# Patient Record
Sex: FEMALE | Race: WHITE | Employment: STUDENT | URBAN - METROPOLITAN AREA
[De-identification: names, ages, dates, MRNs, and addresses within clinical notes are randomized per-mention and may not be internally consistent; named-entity substitution may affect disease eponyms.]

---
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
## 2019-01-03 ENCOUNTER — HOSPITAL ENCOUNTER (OUTPATIENT)
Dept: PHYSICAL THERAPY | Age: 19
Discharge: HOME | End: 2019-01-03
Payer: COMMERCIAL

## 2019-01-03 PROCEDURE — 97110 THERAPEUTIC EXERCISES: CPT

## 2019-01-03 PROCEDURE — 97140 MANUAL THERAPY 1/> REGIONS: CPT

## 2019-01-03 PROCEDURE — 97016 VASOPNEUMATIC DEVICE THERAPY: CPT

## 2019-01-03 NOTE — PROGRESS NOTES
PT DAILY TREATMENT NOTE - [facility] 2-15 Patient Name: Lorna Conway Date:1/3/2019 : 2000 [x]  Patient  Verified Payor: BLUE CROSS / Plan: [facility] PPO / Product Type: PPO / In time:10:30 am  Out time:11:40 am 
Total Treatment Time (min): 70 minutes Total Timed Codes (min): 70 minutes 1:1 Treatment Time (MC only): 39 minutes Visit #: 2 Treatment Area: Bilateral knee pain [M25.561, M25.562] SUBJECTIVE Pain Level (0-10 scale): 0/10 Any medication changes, allergies to medications, adverse drug reactions, diagnosis change, or new procedure performed?: [x] No    [] Yes (see summary sheet for update) Subjective functional status/changes:   [] No changes reported Pt reports same symptoms since evaluation. OBJECTIVE Modality rationale: decrease inflammation and decrease pain to improve the patients ability to ambulate with decreased pain. Type Additional Details  
 
 [] Estim: []Att   []Unatt    []TENS instruct []IFC  []Premod   []NMES []Other:  []w/US   []w/ice   []w/heat Position: Location:  
 
 []  Traction: [] Cervical       []Lumbar 
                     [] Prone          []Supine []Intermittent   []Continuous Lbs: 
[] before manual 
[] after manual 
[]w/heat  
 []  Ultrasound: []Continuous   [] Pulsed  
                    at: []1MHz   []3MHz Location: 
W/cm2:  
 [] Paraffin Location:  
[]w/heat  
 []  Ice     []  Heat 
[]  Ice massage Position: Location:  
 []  Laser 
[]  Other: Position: Location:  
 
15 []  Vasopneumatic Device Pressure:       [] lo [x] med [] hi  
Temperature: 34  
 
[x] Skin assessment post-treatment:  [x]intact []redness- no adverse reaction 
  []redness  adverse reaction:  
 
45 min Therapeutic Exercise:  [x] See flow sheet :  
Rationale: increase ROM and increase strength to improve the patients ability to run without pain. 10 min Manual Therapy: Medial patellar glides; Whipple taping of bilateral patella Rationale: decrease pain and increase tissue extensibility to improve the patients ability to run without pain. With 
 [x] TE 
 [] TA 
 [] neuro 
 [] other: Patient Education: [x] Review HEP [] Progressed/Changed HEP based on:  
[] positioning   [] body mechanics   [] transfers   [] heat/ice application   
[] other:   
 
Other Objective/Functional Measures:  
 
Pain Level (0-10 scale) post treatment: 0/10 ASSESSMENT/Changes in Function:  
Performed Whipple taping technique for proprioceptive input as well as pain reduction with activity; Pt noted feeling \"weird,\" but did not have any pain with ambulation afterwards; will monitor response over the rest of the day. Initiated SL squats in order to begin loading the knee in tolerable fashion; Pt noted mild discomfort around 3-4/10 p! Initially that did not increase with repetition. Also added squats on total gym that initially caused bilateral knee discomfort, however, gravity was reduced and Pt noted pain got better with repetition. Added side steps and monster walks for CKC hip abduction strengthening without any p! Reported by Pt. Continue progressing therex as tolerated. Patient will continue to benefit from skilled PT services to modify and progress therapeutic interventions, address functional mobility deficits, address ROM deficits, address strength deficits, analyze and address soft tissue restrictions, analyze and cue movement patterns and analyze and modify body mechanics/ergonomics to attain remaining goals. [x]  See Plan of Care 
[]  See progress note/recertification 
[]  See Discharge Summary Progress towards goals / Updated goals: 
Short Term Goals: To be accomplished in 6 treatments: 1. Pt will be independent and compliant with HEP.  
             2. Pt will improve FOTO score by the MCID from 66/100 to 80/100 demonstrating improved overall function with decreased pain or discomfort. 3. Pt will be able to pass the Running Readiness Assessment demonstrating sufficient strength to return to run without pain. 4. Pt will be able to run >/= 6K without complaints of knee pain >2/10 bilaterally. 5. Pt will be able to navigate a full flight of stairs without knee pain >2/10 bilaterally. PLAN [x]  Upgrade activities as tolerated     [x]  Continue plan of care [x]  Update interventions per flow sheet      
[]  Discharge due to:_ 
[]  Other:_ Lesa Amezcua 1/3/2019  10:03 AM

## 2019-01-07 ENCOUNTER — HOSPITAL ENCOUNTER (OUTPATIENT)
Dept: PHYSICAL THERAPY | Age: 19
Discharge: HOME | End: 2019-01-07
Payer: COMMERCIAL

## 2019-01-07 PROCEDURE — 97140 MANUAL THERAPY 1/> REGIONS: CPT

## 2019-01-07 PROCEDURE — 97110 THERAPEUTIC EXERCISES: CPT

## 2019-01-07 NOTE — PROGRESS NOTES
PT DAILY TREATMENT NOTE - [facility] 2-15 Patient Name: Chery Artis Date:2019 : 2000 [x]  Patient  Verified Payor: BLUE CROSS / Plan: [facility] PPO / Product Type: PPO / In time: 3:03 pm  Out time:4:02 pm 
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 \"  She only had mild discomfort at end of the day on Saturday and after performing SL squats at home, though ice relieved this discomfort. OBJECTIVE 51 min Therapeutic Exercise:  [x] See flow sheet :  
Rationale: increase ROM and increase strength to improve the patients ability to run without pain. 8 min Manual Therapy: Medial patellar glides; Whipple taping of bilateral patella Rationale: decrease pain and increase tissue extensibility to improve the patients ability to run without pain. With 
 [x] TE 
 [] TA 
 [] neuro 
 [] other: Patient Education: [x] Review HEP [] Progressed/Changed HEP based on:  
[] positioning   [] body mechanics   [] transfers   [] heat/ice application   
[] other:   
 
Other Objective/Functional Measures:  
 
Pain Level (0-10 scale) post treatment: 0/10 ASSESSMENT/Changes in Function:  
Utilizing SL Squats as an asterisk sign, Pt noted 4-5/10 pain in R knee with R SL squats without tape which was completely relieved post-Whipple taping technique.   Pt did not note any pain with L SL squats pre or post-taping. Introduced wall sits and Pt was able to perform for 40\" before fatigue. Also added steamboats in order to promote stabilization in stance leg and hip strengthening in active leg; Pt noted fatigue with exercise, though denied any pain. Pt leaves for college this weekend, so plan is to D/C next session. Patient will continue to benefit from skilled PT services to modify and progress therapeutic interventions, address functional mobility deficits, address ROM deficits, address strength deficits, analyze and address soft tissue restrictions, analyze and cue movement patterns and analyze and modify body mechanics/ergonomics to attain remaining goals. [x]  See Plan of Care 
[]  See progress note/recertification 
[]  See Discharge Summary Progress towards goals / Updated goals: 
Short Term Goals: To be accomplished in 6 treatments: 1. Pt will be independent and compliant with HEP. 2. Pt will improve FOTO score by the MCID from 66/100 to 80/100 demonstrating improved overall function with decreased pain or discomfort. 3. Pt will be able to pass the Running Readiness Assessment demonstrating sufficient strength to return to run without pain. 4. Pt will be able to run >/= 6K without complaints of knee pain >2/10 bilaterally. 5. Pt will be able to navigate a full flight of stairs without knee pain >2/10 bilaterally. PLAN [x]  Upgrade activities as tolerated     [x]  Continue plan of care [x]  Update interventions per flow sheet      
[]  Discharge due to:_ 
[]  Other:_ Ronan Guerrier 1/7/2019  10:03 AM

## 2019-01-10 ENCOUNTER — APPOINTMENT (OUTPATIENT)
Dept: PHYSICAL THERAPY | Age: 19
End: 2019-01-10
Payer: COMMERCIAL

## 2019-03-12 NOTE — ANCILLARY DISCHARGE INSTRUCTIONS
[facility]  [address] [address], [address] [place] [address], [address] [place] [address]  Phone: [phone]  Fax: [phone]    Discharge Summary  2-15    Patient name: Harshil Adams  : 2000  Provider#: [id number]  Referral source: Noah Parker MD      Medical/Treatment Diagnosis: Bilateral knee pain [M25.561, M25.562]     Prior Hospitalization: see medical history     Comorbidities: Asthma  Prior Level of Function: Patient completed 20 minutes of exercise at least 3 times a week. Medications: Verified on Patient Summary List     Start of Care: 18      Onset Date:Chronic   Visits from Start of Care: 3     Missed Visits: 1  Reporting Period : 18 to 19    ASSESSMENT/SUMMARY OF CARE: Ms. Montserrat Merino was seen for a total of 3 skilled physical therapy visits secondary to chronic bilateral knee pain. Her treatment program was cut short due to her leaving for college 2 weeks after evaluation. Pt was provided an HEP to assist in promoting knee control/stability, lower extremity strength, and decreased tightness within lower extremity musculature. Pt cancelled her last appointment and is thus being discharged today, 3/12/19. Final objective data and outcomes were unable to be obtained.     RECOMMENDATIONS:  [x]Discontinue therapy: []Patient has reached or is progressing toward set goals      [x]Patient is non-compliant or has abdicated      []Due to lack of appreciable progress towards set [address] [facility] 3/12/2019

## 2019-07-08 ENCOUNTER — OFFICE VISIT (OUTPATIENT)
Dept: INTERNAL MEDICINE CLINIC | Age: 19
End: 2019-07-08

## 2019-07-08 VITALS
WEIGHT: 141.1 LBS | DIASTOLIC BLOOD PRESSURE: 79 MMHG | RESPIRATION RATE: 16 BRPM | HEART RATE: 84 BPM | OXYGEN SATURATION: 95 % | HEIGHT: 67 IN | BODY MASS INDEX: 22.15 KG/M2 | TEMPERATURE: 97.8 F | SYSTOLIC BLOOD PRESSURE: 128 MMHG

## 2019-07-08 DIAGNOSIS — Z01.419 WELL WOMAN EXAM: Primary | ICD-10-CM

## 2019-07-08 DIAGNOSIS — J45.990 EXERCISE-INDUCED ASTHMA: ICD-10-CM

## 2019-07-08 DIAGNOSIS — R07.9 CHEST PAIN, UNSPECIFIED TYPE: ICD-10-CM

## 2019-07-08 NOTE — PROGRESS NOTES
Chief Complaint   Patient presents with    Complete Physical     she is a 22y.o. year old female who presents for CPE  Complete Physical Exam Questions:    LMP -  Last monday  Last Pap (q 3 years, or q5 with HPV) - n/a  Last Mammogram (50-74 biennially)- n/a  Hx of abnl Pap - Not applicable    1. Do you follow a low fat diet?  no  2. Are you up to date on your Tdap (<10 years)? Yes  3. Have you ever had a Pneumovax vaccine (>65)? Not applicable   MJA12 Not applicable   VRQB17 Not applicable  4. Have you had Zoster vaccine (>60)? Not applicable  5. Have you had the HPV - Gardasil (13- 26)? No  6. Do you follow an exercise program?  no  7. Do you smoke?  no  8. Do you consider yourself overweight?  no  9. Is there a family history of CAD< age 48? No  10. Is there a family history of Cancer?  yes  11. Do you know your Cancer risks? No  12. Have you had a colonoscopy?  no  13. Have you been tested for HIV or other STI's? No HIV testing today(18-66 y/o)? No  14. Have had a bone density scan or DEXA done(>65)? Not applicable  15. Have you had an EKG performed in the last five years (>50)? Not applicable    Other complaints: Patient also complains of chest pain left side for the past 2 weeks. Patient states that she is a highly anxious person in general and believes that could be due to this. Pain is cramping in nature and not positional.  Patient states she did not have any trouble and this all started. She has had increased stress recently. Denies any shortness of breath ESPINOSA. She is a cross-country runner and states that she has not had any problems while she is been running. No relationship to food intake. Currently she has no pain and can not press on chest wall to replicate pain    Reviewed and agree with Nurse Note and duplicated in this note. Reviewed PmHx, RxHx, FmHx, SocHx, AllgHx and updated and dated in the chart. History reviewed. No pertinent family history. History reviewed.  No pertinent past medical history. Social History     Socioeconomic History    Marital status: SINGLE     Spouse name: Not on file    Number of children: Not on file    Years of education: Not on file    Highest education level: Not on file   Tobacco Use    Smoking status: Never Smoker    Smokeless tobacco: Never Used   Substance and Sexual Activity    Alcohol use: No     Frequency: Never    Drug use: No    Sexual activity: Never        Review of Systems - negative except as listed above      Objective:     Vitals:    07/08/19 1054   BP: 128/79   Pulse: 84   Resp: 16   Temp: 97.8 °F (36.6 °C)   TempSrc: Oral   SpO2: 95%   Weight: 141 lb 1.6 oz (64 kg)   Height: 5' 7\" (1.702 m)       Physical Examination: General appearance - alert, well appearing, and in no distress  Eyes - pupils equal and reactive, extraocular eye movements intact  Ears - bilateral TM's and external ear canals normal  Nose - normal and patent, no erythema, discharge or polyps  Mouth - mucous membranes moist, pharynx normal without lesions  Neck - supple, no significant adenopathy  Chest - clear to auscultation, no wheezes, rales or rhonchi, symmetric air entry  Heart - normal rate, regular rhythm, normal S1, S2, no murmurs, rubs, clicks or gallops  Abdomen - soft, nontender, nondistended, no masses or organomegaly  Neurological - alert, oriented, normal speech, no focal findings or movement disorder noted  Musculoskeletal - no joint tenderness, deformity or swelling  Extremities - peripheral pulses normal, no pedal edema, no clubbing or cyanosis  Skin - normal coloration and turgor, no rashes, no suspicious skin lesions noted      Assessment/ Plan:   Diagnoses and all orders for this visit:    1. Well woman exam  -     CBC W/O DIFF  -     LIPID PANEL  -     METABOLIC PANEL, COMPREHENSIVE    2. Exercise-induced asthma    3.  Chest pain, unspecified type  -     AMB POC EKG ROUTINE W/ 12 LEADS, INTER & REP    Likely due to anxiety, EKG is normal today. Patient will continue to monitor symptoms and we will schedule appointment in 2 weeks to go over any correlation with food intake and/or exercise activity. Labs to be drawn: CBC, CMP, Lipid       I have discussed the diagnosis with the patient and the intended plan as seen in the above orders. The patient has received an after-visit summary and questions were answered concerning future plans. Medication Side Effects and Warnings were discussed with patient,  Patient Labs were reviewed and or requested, and  Patient Past Records were reviewed and or requested  yes         Pt agrees to call or return to clinic and/or go to closest ER with any worsening of symptoms. This may include, but not limited to increased fever (>100.4) with NSAIDS or Tylenol, increased edema, confusion, rash, worsening of presenting symptoms.

## 2019-07-09 LAB
ALBUMIN SERPL-MCNC: 4.6 G/DL (ref 3.5–5.5)
ALBUMIN/GLOB SERPL: 1.5 {RATIO} (ref 1.2–2.2)
ALP SERPL-CCNC: 68 IU/L (ref 39–117)
ALT SERPL-CCNC: 14 IU/L (ref 0–32)
AST SERPL-CCNC: 24 IU/L (ref 0–40)
BILIRUB SERPL-MCNC: 0.3 MG/DL (ref 0–1.2)
BUN SERPL-MCNC: 13 MG/DL (ref 6–20)
BUN/CREAT SERPL: 17 (ref 9–23)
CALCIUM SERPL-MCNC: 10 MG/DL (ref 8.7–10.2)
CHLORIDE SERPL-SCNC: 102 MMOL/L (ref 96–106)
CHOLEST SERPL-MCNC: 214 MG/DL (ref 100–169)
CO2 SERPL-SCNC: 25 MMOL/L (ref 20–29)
CREAT SERPL-MCNC: 0.75 MG/DL (ref 0.57–1)
ERYTHROCYTE [DISTWIDTH] IN BLOOD BY AUTOMATED COUNT: 14.3 % (ref 12.3–15.4)
GLOBULIN SER CALC-MCNC: 3 G/DL (ref 1.5–4.5)
GLUCOSE SERPL-MCNC: 79 MG/DL (ref 65–99)
HCT VFR BLD AUTO: 36.9 % (ref 34–46.6)
HDLC SERPL-MCNC: 74 MG/DL
HGB BLD-MCNC: 11.7 G/DL (ref 11.1–15.9)
LDLC SERPL CALC-MCNC: 125 MG/DL (ref 0–109)
MCH RBC QN AUTO: 25 PG (ref 26.6–33)
MCHC RBC AUTO-ENTMCNC: 31.7 G/DL (ref 31.5–35.7)
MCV RBC AUTO: 79 FL (ref 79–97)
PLATELET # BLD AUTO: 258 X10E3/UL (ref 150–450)
POTASSIUM SERPL-SCNC: 4.6 MMOL/L (ref 3.5–5.2)
PROT SERPL-MCNC: 7.6 G/DL (ref 6–8.5)
RBC # BLD AUTO: 4.68 X10E6/UL (ref 3.77–5.28)
SODIUM SERPL-SCNC: 142 MMOL/L (ref 134–144)
TRIGL SERPL-MCNC: 77 MG/DL (ref 0–89)
VLDLC SERPL CALC-MCNC: 15 MG/DL (ref 5–40)
WBC # BLD AUTO: 5.1 X10E3/UL (ref 3.4–10.8)

## 2019-07-09 NOTE — PROGRESS NOTES
Your \"Bad\" cholesterol (LDL) is elevated. Please eat a healthier diet as described below. In particular avoid fried, fatty and junk foods, while increasing fiber (fruits and vegetables). If you cannot increase fiber through diet, you can supplement with metamucil as directed on bottle daily. Also, make sure you are taking 1 to 2 grams of over the counter fish oil. Increase exercise to 5 times per week of cardio lasting at least 30 min's each (biking, walking, elliptical, swimming). Lets recheck the fasting (atleast eight hours) in 6 months. Mediterranean diet: Choose this heart-healthy diet option  The Mediterranean diet is a heart-healthy eating plan combining elements of Mediterranean-style cooking. Here's how to adopt the Mediterranean diet. If you're looking for a heart-healthy eating plan, the Mediterranean diet might be right for you. The Mediterranean diet incorporates the basics of healthy eating  plus a splash of flavorful olive oil and perhaps a glass of red wine  among other components characterizing the traditional cooking style of countries bordering the [facility]. Most healthy diets include fruits, vegetables, fish and whole grains, and limit unhealthy fats. While these parts of a healthy diet remain tried-and-true, subtle variations or differences in proportions of certain foods may make a difference in your risk of heart disease. Benefits of the [address] has shown that the traditional Mediterranean diet reduces the risk of heart disease. In fact, a recent analysis of more than 1.5 million healthy adults demonstrated that following a Mediterranean diet was associated with a reduced risk of overall and cardiovascular mortality, a reduced incidence of cancer and cancer mortality, and a reduced incidence of Parkinson's and Alzheimer's diseases.    For this reason, most if not all major scientific organizations encourage healthy adults to adapt a style of eating like that of the [address] [facility] for prevention of major chronic diseases. Key components of the Mediterranean diet  The Mediterranean diet emphasizes:   Getting plenty of exercise   Eating primarily plant-based foods, such as fruits and vegetables, whole grains, legumes and nuts   Replacing butter with healthy fats such as olive oil and canola oil   Using herbs and spices instead of salt to flavor foods   Limiting red meat to no more than a few times a month   Eating fish and poultry at least twice a week   Drinking red wine in moderation (optional)   The diet also recognizes the importance of enjoying meals with family and friends. Fruits, vegetables, nuts and grains  The Mediterranean diet traditionally includes fruits, vegetables, pasta and rice. For example, residents of [facility] eat very little red meat and average nine servings a day of antioxidant-rich fruits and vegetables. The Mediterranean diet has been associated with a lower level of oxidized low-density lipoprotein (LDL) cholesterol  the \"bad\" cholesterol that's more likely to build up deposits in your arteries. Nuts are another part of a healthy Mediterranean diet. Nuts are high in fat (approximately 80 percent of their calories come from fat), but most of the fat is not saturated. Because nuts are high in calories, they should not be eaten in large amounts  generally no more than a handful a day. For the best nutrition, avoid candied or honey-roasted and heavily salted nuts. Grains in the [address] [place] [address] region are typically whole grain and usually contain very few unhealthy trans fats, and bread is an important part of the diet there. However, throughout the [address] [place] [address] region, bread is eaten plain or dipped in olive oil  not eaten with butter or margarines, which contain saturated or trans fats.

## 2020-06-10 ENCOUNTER — OFFICE VISIT (OUTPATIENT)
Dept: INTERNAL MEDICINE CLINIC | Age: 20
End: 2020-06-10

## 2020-06-10 VITALS
BODY MASS INDEX: 21.82 KG/M2 | SYSTOLIC BLOOD PRESSURE: 128 MMHG | TEMPERATURE: 98.4 F | HEART RATE: 86 BPM | WEIGHT: 139 LBS | RESPIRATION RATE: 16 BRPM | HEIGHT: 67 IN | OXYGEN SATURATION: 100 % | DIASTOLIC BLOOD PRESSURE: 74 MMHG

## 2020-06-10 DIAGNOSIS — M22.2X2 PATELLOFEMORAL SYNDROME OF BOTH KNEES: ICD-10-CM

## 2020-06-10 DIAGNOSIS — F41.9 ANXIETY: Primary | ICD-10-CM

## 2020-06-10 DIAGNOSIS — M22.2X1 PATELLOFEMORAL SYNDROME OF BOTH KNEES: ICD-10-CM

## 2020-06-10 DIAGNOSIS — N92.6 ABNORMAL MENSTRUAL CYCLE: ICD-10-CM

## 2020-06-10 DIAGNOSIS — R19.7 DIARRHEA, UNSPECIFIED TYPE: ICD-10-CM

## 2020-06-10 DIAGNOSIS — Z02.5 SPORTS PHYSICAL: ICD-10-CM

## 2020-06-10 DIAGNOSIS — G47.26 SHIFT WORK SLEEP DISORDER: ICD-10-CM

## 2020-06-10 NOTE — PROGRESS NOTES
Chief Complaint   Patient presents with    Sports Physical         Pt presents for sports clearance for cross country . Pt denies Family history of sudden death at young age due to cardiac reasons. Pt has asthma or exercise induced asthma symptoms. Pt denies pain in joints or injuries affecting sports or training. Pt denies loss of any bilateral organ (i.e. 1 testicle, 1 ovary, 1 eye. ..). 1. Chest pain, shortness of breath or wheezing with exercise: None  2. Syncope with exercise: None  3. History of heart murmur or HTN: None  4. Concussions or head injury: None  5. History of Seizure: None  6. Heat illness: None  7. Disqualifications or restrictions from sports participation in the past: None  8. Injuries to muscle, tendon, or ligament: None  9. Fractured bone: None  10. Stress fracture: None  11. Ongoing medical conditions: None  12. Ever needed an inhaler for exercise: Yes - 5 years ago   13. Sickle Cell disease or trait: None  14. Surgeries: None  15. Any unpaired organs: None  16. Vision impairment: None  17. Glasses or Contacts: None  18. Hearing impairment: None  19. Weight changes: None  20. Trying to gain/lose weight: NO    Family History:  1. CAD, MI, or sudden death before the age of 48: YES  2. HTN: YYES  3. Diabetes: NO  4. Asthma: NO  5. Sickle cell trait or disease: NO    Patient has multiple complaints. 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 States that she is having trouble sleeping during daytime. She has not had a night shift before and has not started melatonin. Patient states that she has had anxiety for the last several years but no panic attacks. Patient states that suicidal homicidal ideations but states that this is worsening and she would like to start counseling or medication for this. Patient complains of bilateral knee pain when she is working her shift work.   Patient states that after all night sitting at a desk she feels like her knees are swollen and causing pain. Patient has no history of rheumatoid or any trauma history. Patient also complains of ongoing episodes for the last few years of diarrhea. Patient states he relates this to eating gluten or lactose. Patient states she usually relieves this by eating a lactase aid. Also states that this gets worse when she is full anxiety and stress. Denies any abdominal bleeding or pain currently. Patient does state that her menses have changed over this past year. States that they are very irregular sometimes 27 days sometimes 31 days in the coupon rotating. Cycle length is normal and her bleeding is normal.  Reviewed and agree with Nurse Note and duplicated in this note. Reviewed PmHx, RxHx, FmHx, SocHx, AllgHx and updated and dated in the chart. History reviewed. No pertinent family history. History reviewed. No pertinent past medical history.    Social History     Socioeconomic History    Marital status: SINGLE     Spouse name: Not on file    Number of children: Not on file    Years of education: Not on file    Highest education level: Not on file   Tobacco Use    Smoking status: Never Smoker    Smokeless tobacco: Never Used   Substance and Sexual Activity    Alcohol use: No     Frequency: Never    Drug use: No    Sexual activity: Never        Review of Systems - negative except as listed above      Objective:     Vitals:    06/10/20 1355   BP: 128/74   Pulse: 86   Resp: 16   Temp: 98.4 °F (36.9 °C)   TempSrc: Oral   SpO2: 100%   Weight: 139 lb (63 kg)   Height: 5' 7\" (1.702 m)       Physical Examination: General appearance - alert, well appearing, and in no distress  Eyes - pupils equal and reactive, extraocular eye movements intact  Ears - bilateral TM's and external ear canals normal  Nose - normal and patent, no erythema, discharge or polyps  Mouth - mucous membranes moist, pharynx normal without lesions  Neck - supple, no significant adenopathy  Chest - clear to auscultation, no wheezes, rales or rhonchi, symmetric air entry  Heart - normal rate, regular rhythm, normal S1, S2, no murmurs, rubs, clicks or gallops  Abdomen - soft, nontender, nondistended, no masses or organomegaly  Neurological - alert, oriented, normal speech, no focal findings or movement disorder noted  Musculoskeletal - bilateral knee exam:  The patient'sbilateral Knee  is  normal to inspection. The ROM is normal and there is flexion to Normal Effusion is: mild The joint exhibits Negative warmth and Crepitus is: mild. The Jeet test is:  negative Joint Line Tenderness is  negative . The [facility] Test is negative  and the Lachman is  negative. The Anterior Drawer is negative. The Posterior Drawer is:  negative. Valgus Stress (for MCL) is:  normal . Varus Stress (for LCL) is  normal  . The Crow Test is negative and the Apprehension Sign:  negative  Patellar Grind negative   Extremities - peripheral pulses normal, no pedal edema, no clubbing or cyanosis  Skin - normal coloration and turgor, no rashes, no suspicious skin lesions noted     Assessment/ Plan:   Diagnoses and all orders for this visit:    1. Anxiety  -     TSH 3RD GENERATION    2. Diarrhea, unspecified type  -     CELIAC ANTIBODY PROFILE    3. Abnormal menstrual cycle  -     REFERRAL TO OBSTETRICS AND GYNECOLOGY    4. Shift work sleep disorder    5. Sports physical    6. Patellofemoral syndrome of both knees               I have discussed the diagnosis with the patient and the intended plan as seen in the above orders. The patient has received an after-visit summary and questions were answered concerning future plans. Medication Side Effects and Warnings were discussed with patient: yes  Patient Labs were reviewed and or requested: yes  Patient Past Records were reviewed and or requested  yes  I have discussed the diagnosis with the patient and the intended plan as seen in the above orders.   The patient has received an after-visit summary and questions were answered concerning future plans. Pt agrees to call or return to clinic and/or go to closest ER with any worsening of symptoms. This may include, but not limited to increased fever (>100.4) with NSAIDS or Tylenol, increased edema, confusion, rash, worsening of presenting symptoms. Patient was informed/counseled to:    1) Remember to stay active and/or exercise regularly (I suggest 30-45 minutes daily)   2) For reliable dietary information, go to www. [employer]AT[employer].org. You may wish to consider seeing the nutritionist at [facility] at #[phone] or [phone], also consider the [address] [facility]. 3) I routinely suggest a complete physical exam once each year (your birth month)    Please note that this dictation was completed with [email or web address], the computer voice recognition software. Quite often unanticipated grammatical, syntax, homophones, and other interpretive errors are inadvertently transcribed by the computer software. Please disregard these errors. Please excuse any errors that have escaped final proofreading. Thank you.

## 2020-06-13 LAB
GLIADIN PEPTIDE IGA SER-ACNC: 4 UNITS (ref 0–19)
GLIADIN PEPTIDE IGG SER-ACNC: 3 UNITS (ref 0–19)
IGA SERPL-MCNC: 122 MG/DL (ref 87–352)
TSH SERPL DL<=0.005 MIU/L-ACNC: 4.29 UIU/ML (ref 0.45–4.5)
TTG IGA SER-ACNC: <2 U/ML (ref 0–3)
TTG IGG SER-ACNC: 4 U/ML (ref 0–5)